# Patient Record
Sex: MALE | Race: WHITE | URBAN - METROPOLITAN AREA
[De-identification: names, ages, dates, MRNs, and addresses within clinical notes are randomized per-mention and may not be internally consistent; named-entity substitution may affect disease eponyms.]

---

## 2018-01-25 ENCOUNTER — OFFICE VISIT (OUTPATIENT)
Dept: OTHER | Facility: OUTPATIENT CENTER | Age: 43
End: 2018-01-25
Payer: COMMERCIAL

## 2018-01-25 DIAGNOSIS — F52.8 OTHER SEXUAL DYSFUNCTION NOT DUE TO A SUBSTANCE OR KNOWN PHYSIOLOGICAL CONDITION: Primary | ICD-10-CM

## 2018-01-25 ASSESSMENT — ANXIETY QUESTIONNAIRES
1. FEELING NERVOUS, ANXIOUS, OR ON EDGE: NOT AT ALL
3. WORRYING TOO MUCH ABOUT DIFFERENT THINGS: NOT AT ALL
7. FEELING AFRAID AS IF SOMETHING AWFUL MIGHT HAPPEN: NOT AT ALL
6. BECOMING EASILY ANNOYED OR IRRITABLE: NOT AT ALL
2. NOT BEING ABLE TO STOP OR CONTROL WORRYING: NOT AT ALL
5. BEING SO RESTLESS THAT IT IS HARD TO SIT STILL: NOT AT ALL
GAD7 TOTAL SCORE: 0

## 2018-01-25 ASSESSMENT — PATIENT HEALTH QUESTIONNAIRE - PHQ9: 5. POOR APPETITE OR OVEREATING: NOT AT ALL

## 2018-01-25 NOTE — PROGRESS NOTES
"[facility]  Program in Human Sexuality  Department of Family Medicine & Community Health  [facility]   [address]               [place] [address]  Phone: [phone]  Fax: [phone]  [email or web address]    Relationship and Sex Therapy (REST) Diagnostic Assessment Interview    Date of Service: 1/25/18  Client Name: Genevieve Smyth  YOB: 1975  42 year old  MRN:  [id number]  Gender/Gender Identity: Male He/Him  Treating Provider: Paul Waddell PsyD   Program: REST  Type of Session: Assessment  Present in Session: Client   Number of Minutes:  55       Instructions for therapists: X Introduce yourself; X Description of process;    X Redcap Questionnaires; X Psychological Testing (follow prior authorization procedures as needed); X Review client confidentiality     Sources of Information: All information in this assessment was taken from the Client's initial paperwork and clinical interview.    Referral Source: Client received a suggestion to seeks services at the [facility] by another healthcare provider.     Identifying Information: Client is a 42 year old  individual, assigned male at birth who identifies as a heterosexual male. Client grew up Shinto, he denied any other culturally relevant factors.     Chief Complaint/Presenting Problem and Goals: Client wrote that he has an \"inabitlity to climax with my wife, complicating conceiving a child.\" He wrote that his goal is \"gaining the ability to climax with my wife.\"     During the interview Client clarified that he is unable to orgasm through vaginal intercourse. He shared that he has never been able achieve an orgasm through vaginal intercourse with any partners. Client shared that in their relationship \"it's never been an issue until we started trying to have a child.\" Client indicated that \"it s not that I m embarrassed. Intitally I thought it was because " "we weren t ,  I was raised very Taoist. I thought afterwards that mental break would lift.\" After they were  however, his challenges with orgasm continued. Client shared that this has caused some distress in his partner and their relationship, \"My wife wonders if there is something about her, since she can t get me to orgasm. It makes things more difficult about having a child. In the past when I ve thought it s not a big thing, but it s a big thing.\"  Sexual behaviors/Functioning: Client shared that \"My wife and I have sex fairly regularly, a couple times a week. It s been fairly business like lately. It s a conversation about taking the ovulation tests. It s a convo that we should have sex this evening if the time is right. There's foreplay, intercourse and after intercourse I masturbate, and then we insert the semen into her.\" Client clarified that he is able to orgasm from manual stimulation while she is present.   Client shared that prior to attempting to have children their sexual behavior was \"much more spontenous. Initiated by either one of us.\"    Sexual Desire/Interest/Arousal: Client denied any concerns about sexual desire or arousal. He is satisfied with their current sexual frequency and engages in individual masturbation on weeks when his partner is menstruating.     Erections: Client denied any concerns in achieving and maintaining erections.    Lubrication: Client denied any concerns related to lubrication.    Orgasm and Ejaculation: Client shared that he has always been unable to orgasm through vaginal penetration. He has managed this by stimulating himself through masturbation to orgasm. Client is able to do this with a partner present. He shared that after vaginal penetration, he will masturbate and achieve an orgasm \"sometimes almost immediately and sometimes it takes several minutes.\" Client denied having any experiences of orgasm without ejaculation. It is unclear if Client can " "reach orgasm through partnered stimulation through other methods besides vaginal penetration. This should be explored further in subsequent sessions.      Pain During Sex: Client denied any concerns related to sexual pain.    Sexual aversion/avoidance: Initially Client denied that he had any sexual aversion, however he went on to say \"I've avoided sex most of my life, I'm 42 right now and my first sexual experience was [age].\" The reasons underlying Client's avoidance will be covered in more detail during subsequent sessions.     Masturbation: Client currently masturbates \"every time we have sex\" and \"a couple times of a week\" when his partner is menstruating. Client began masturbating \"when I was a teenager\" and learned how through \"self-exploration.\" During this time he would use \"magazines, stories, and sometimes my own imagination.\" Client recalled that \"I didn't feel bad about it but I knew I couldn't get caught.\"  Self-exploration. Sometimes it was magazines, stories, sometimes just my own imagination.  Back before internet. Didn t feel bad about it but knew I couldn t get caught.     Sexual Interests: Client denied engaging in any compulsive sexual behavior. Client denied having or engaging in any paraphilic interests or fetishes. He shared \"no we're pretty johnson milktoast.\"     Client denied any concerns related to sexual orientation or gender identity.     First sexual experience with partner: Client's first sexual experience with a partner was around the age of 35 or 36. Client described the event as follows: \"I d been dating a girl for a month or so. We went to a friends [employer] party. We went back to my place. We started fooling around. She made a comment  I bet you don t have condoms  but I did and we ended up having sex. I was thrilled at the moment but after the fact she left right after and then a few days later broke up with me. She made a comment that she had never not been able to get a nallely to " "climax before.\"    Unpleasant or traumatic sexual experiences: Client denied having any unpleasant or traumatic sexual experiences. However, as he described his initial sexual experience with a partner, it appeared to be negative.     Relationship History:    Past Relationship History: Client identified four sexual relationships including his current wife. Client shared that he has been unable to orgasm during vaginal penetration with all of his partners. Client shared that all of his relationships prior to the one with is wife were \"fairly short term. It was simply a matter of, I would meet a girl go out on a series of dates. We would have sex and as we got to know each other and found out we were different places.\"     Current Relationship History: Client's wife is 39 and a veternarian technologist. The two have been together almost five years and  for almost three years. Client meet his wife on an online dating website. He shared that their relationship \"was a fairly slow progression.  We were in a relationship before having sex.\"  He shared that his inability to orgasm through vaginal penetration was never a concern early on in their relationship. However, around 2015 when they were engaged they began trying to conceive a child. Complicating the mater, Client's partner has polycystic ovarian syndrome. The two are meeting with a fertility specialist to determine what their options are.     Couple Relationship Assessment   1 =  Totally Dissatisfied     10 = Totally Satisfied                 Overall happiness/Satisfaction:  9   Personality of partner:  10   Communication/Conflict resolution:  8   Financial management:  8   Sexual relationship:  6   Children/parenting:   N/A   Family and friends:  8   Household roles/responsibilities:  9   Other: 0       Mental Health History:Client denied a history of hospitalizations for mental health. Client denied a history of suicide attempts. Client reported a history " "of suicidal ideation that was passive,  \"I never made a plan but I did think okay if I was going to do it how would I?\" Client shared that this was in 2009 and he attributed this to being \"alone in a bad job.\" Client denied engaging in any self-injurious behaviors.     Client affirmed a history of previous psychotherapy. Client saw a psychologist in [place], [place] in 2009 for 10 to 11 months. Client worked on suicidal ideation, depression, and his negative work environment. Client explained that \"work just kept reinforcing that I was alone. Client shared that psychotherapy was helpful but that \"getting laid off was more helpful.\"     Client has not been prescribed any medications for mental health symptoms    Client affirmed a history of mental health concerns in his family. He shared that \"I believe my dad was depressed but he was never diagnosed.\"     Substance Use: Client does not use any nicotine products. He shared that his alcohol consumption is \"once or twice a month.\" Client explained that he is part of a beer club with his brother in law. Client will have a beer when he picks up the monthly shipment and will consume more \"once or twice a year\" when his brother in law is in town. Client denied using any other substances.    Client received a score of 0 on the CAGE, a screener of substance abuse, indicating no self-reported substance use issues.     Client denied a history of substance use concerns in his family.     Medical History:Client receives primary medical care from Gregory Iyer at [facility] in [place], [place]. He identified the following medical conditions: Type 2 Diabetes and low thyroid. Client was recently diagnosed with these conditions in December of 2017. He is currently prescribed: Metformin (1000 Mg) and Levothyroxine (50 Mg). Client denied having any major illness and/or injuries. He has never been hospitalized for medical reasons.    Client shared that due to fertility " "concerns he has had his sexual health evaluated. They found no abnormalities with his semen. His testosterone levels were \"within the normal range but borderline low.\"     Family History: Client was born and raised in [place], [place]. He grew up in an intact family with his father, mother, one older brother, and one younger brother. Client described his family life growing up as \"Fairly typical leave to [place] family. My parents were  almost 40 years. My dad past away a month before their anniversity. We went to [employer], I played baseball, and was in [employer].\"     Client's father is . Client described their relationship was \"very close and positive.\" Client's mother lives in [place] with Client's younger brother. Client visits them \"a couple times a month.\" Client described his relationship with his mother as \"very close and positive.\"     Client's oldest brother is  and has children. He sees them approximately once a month. Client expressed some resentment in the way Client and his wife are viewed as a couple without children by his brother's family.    Information about Client's families communication and attitudes about sexuality was not covered in this session. This will be explored further in subsequent sessions.                        Abuse History: Client denied experiencing any verbal/emotional, physical, and/or sexual abuse.     Educational History: Client attended private [employer] for middle and high school. Client received his bachelors from [employer] in  with a focus in international business and Albanian. He recently started a masters in business administration at [employer] which he hopes to complete by 2020. Client denied any concerns related to performance or behavior in school settings.     Occupational history: Client is employed as a  at [employer] where he has worked for six years. He shared that the job and the job environment are good " "and that \"I'm good at what I do and I enjoy it.\" However, he indicated experiencing some financial dissatisfaction and that he has not received a promotion.     Client shared that he was extremely dissatisfied at his prior job. He identified that he had a high level of work stress and had long hours, which his boss expressed he should do because he did not have a family, unlike his co-workers.    Legal Issues: None.    CONCLUSIONS    Strengths and Liabilities: Client identified his strengths as \"ideation, deliberative, achiever, intellectual input.\" Client did not identify any limitations. However, it appears from this interview, that Client is prone to having a pessimistic outlook on himself, others, the world, and the future.    Symptoms: Client denied making any plans to end his life in the past two weeks. He denied having thoughts that he would be better off dead or hurting himself in some way in the past two weeks.Client shared that the last time he experienced passive suicidal thoughts was in 2009.     Of a list of common mental health concerns which he has been experiencing in the past six months, Client endorsed the following three: overweight, sexual performance difficulties, and work/family problems.      PHQ-9:  PHQ-9 ([employer])        No Interest In Doing Things   0   Feeling Depressed   0   Trouble Sleeping   0    Tired / No Energy   0     No appetite or Over-Eating   0   Feeling Bad about Self   0     Trouble Concentrating   0     Moving Slow or Restless   0     Suicidal Thoughts   0     Total Score   0       PHQ-9 SCORING CARE FOR SEVERITY  Interpretation of Total Score  Total Score   Depression Severity and Recommendations     0-9   No Major Depression     10-14   Moderate. Initial weekly follow up. If patient is responding, monthly contacts. Meds or therapy.     15-19   Moderate severe. Initial weekly follow up. If patient is responding, 2-4 week contacts. Meds and/or therapy.     >20   Severe. Weekly " "contact. Meds and therapy.             MARY-7:  MARY-7 (Pf[employer])        Feeling nervous, anxious, or on edge  0   Not being able to stop or control worrying 0   Worrying too much about different things 0   Trouble relaxing 0   Being so restless that it is hard to sit still 0   Becoming easily annoyed or irritable 0   Feeling afraid, as if something awful might happen 0   Total Score   0      MARY-7 SCORING FOR SEVERITY  Interpretation of Total Score  Total Score   Anxiety Severity    0 - 5  Minimal anxiety   6 - 10  Mild anxiety   11 - 15 Moderate anxiety   16 - 21 Severe anxiety       Mental Status:   Appearance:  Casually dressed and Adequately groomed  Behavior/relationship to examiner/demeanor:  Cooperative, Engaged and Pleasant  Orientation: Oriented to person, place, time and situation  Speech Rate:  Normal  Speech Spontaneity:  Slightly Halting  Mood:  \"good\"  Affect:  Appropriate/mood-congruent and Anxious/Nervous  Thought Process (Associations):  Logical, Linear and Goal directed  Thought Content:  no evidence of suicidal or homicidal ideation, no overt psychosis and depressive cognitions  Abnormal Perception:  None  Attention/Concentration:  Normal  Language:  Intact  Insight:  Fair  Judgment:  Fair        DSM-5 Diagnosis:  Diagnoses       Codes Comments    Other specified sexual dysfunction - 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 Client reported that he has never been able to reach orgasm with a partner through vaginal penetration. He is able to reach orgasm through masturbation both alone and with his partner present. Client initially attributed these challenges to his Restoration upbringing and attitudes about having sex outside of marriage. However, since being  he has become confused about the etiology of his sexual challenges. Client " "shared that prior to he and his wife attempting to conceive a child, this challenge was not problematic for him. However, Client made references to comments prior partners made about his sexual challenges that appear as if they were distressing for him. These challenges are causing significant distress for Client and are impacting his relationship with his wife.    Interactive Complexity: None    Conclusions/Recommendations/Initial Treatment Goals: The client is a 42 year old American person assigned male at birth who identifies as a heterosexual male. 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 The client's sexual health concerns affect client's ability to function in his relationship with his wife and have been causing clinically significant distress. The client reports no drug use and alcohol use \"once or twice a month.\" The patient is also struggling with some dissatisfaction with his inability to advance in his job. Client denies any current safety concerns. Based on the client's reported symptoms and impact on functioning, the plan for the patient is:  1. Start supportive individual therapy with a provider specialized in sexual health, with incorporation of partner as needed. Therapy should focus on:   a. Developing couple sexual comfort, relaxation and cooperation  b. Promoting desire, pleasure and arousal.  c. Enhancing arousal and erotocism.  d. Increasing sexual flexibility.  e. Exploring Client's understanding of his sexual response cycle, erotic flow, and orgasm inevitability.  f. Exploring Client's sexual attitudes and values, as well as how these developed.  g. Exploring the impact of couples goal to conceive a child and how that impacts ability for sexual flexibility.  h. Exploring Client's health habits and how they impede his sexual functioning  2. Consider a medical " evaluation by a provider specialized in sexual health to determine if there are any physical barriers to orgasm.  3. Continue to assess the impact of client's family and relational patterns on their current well-being.   4. Continue to evaluate Client's mental health in relationship to current sexual stressors to determine if he meets criteria for adjustment, anxiety, or mood disorder.      Paul Waddell PsyD  Postdoctoral Fellow

## 2018-01-25 NOTE — MR AVS SNAPSHOT
After Visit Summary   1/25/2018    Genevieve Smyth    MRN: [id number]           Patient Information     Date Of Birth          1975        Visit Information        Provider Department      1/25/2018 2:00 PM Paul Waddell PhD [facility]        Today's Diagnoses     Other sexual dysfunction not due to a substance or known physiological condition    -  1       Follow-ups after your visit        Your next 10 appointments already scheduled     Feb 05, 2018  6:00 PM CST   INDIVIDUAL THERAPY with Paul Waddell PhD   [facility] ([facility])    [address]  Mail [address]  [facility] [address]   [phone]            Mar 01, 2018  4:00 PM CST   INDIVIDUAL THERAPY with Paul Waddell PhD   [facility] ([facility])    [address]  [address]  [facility] [address]   [phone]            Mar 15, 2018  4:00 PM CDT   INDIVIDUAL THERAPY with Paul Waddell PhD   [facility] ([facility])    [address]  [address]  [facility] [address]   [phone]            Mar 29, 2018  4:00 PM CDT   INDIVIDUAL THERAPY with Paul Waddell PhD   [facility] ([facility])    [address]  [address]  [facility] [address]   [phone]              Who to contact     Please call your clinic at [phone] to:    Ask questions about your health    Make or cancel appointments    Discuss your medicines    Learn about your test results    Speak to your doctor   If you have compliments or concerns about an experience at your clinic, or if you wish to file a complaint, please contact [facility] Patient Relations at [phone] or email us at [email or web address]         Additional Information About Your Visit        MyChart Information     [employer] is an electronic gateway that provides easy, online access to your medical  records. With [employer], you can request a clinic appointment, read your test results, renew a prescription or communicate with your care team.     To sign up for [employer] visit the website at [email or web address]   You will be asked to enter the access code listed below, as well as some personal information. Please follow the directions to create your username and password.     Your access code is: [email or web address]  Expires: 2018 12:13 PM     Your access code will  in 90 days. If you need help or a new code, please contact your [facility] or call [phone] for assistance.        Care EveryWhere ID     This is your Care EveryWhere ID. This could be used by other organizations to access your [facility] medical records  [id number]         Blood Pressure from Last 3 Encounters:   No data found for BP    Weight from Last 3 Encounters:   No data found for Wt              We Performed the Following     Diagnostic Assessment (complete) [28613]        Primary Care Provider    None Specified       No primary provider on file.        Equal Access to Services     HILTON RODRIGUEZ : Hadii julieta moreno hadasho Soomaali, waaxda luqadaha, qaybta kaalmada adeegyafer, nuris rock . So [facility] [phone].    ATENCIÓN: Si habla español, tiene a jackson disposición servicios gratuitos de asistencia lingüística. Llame al [phone].    We comply with applicable federal civil rights laws and [place] laws. We do not discriminate on the basis of race, color, national origin, age, disability, sex, sexual orientation, or gender identity.            Thank you!     Thank you for choosing [facility]  for your care. Our goal is always to provide you with excellent care. Hearing back from our patients is one way we can continue to improve our services. Please take a few minutes to complete the written survey that you may receive in the mail after your visit with us. Thank  you!             Your Updated Medication List - Protect others around you: Learn how to safely use, store and throw away your medicines at www.disposemymeds.org.      Notice  As of 1/25/2018 11:59 PM    You have not been prescribed any medications.

## 2018-01-30 ASSESSMENT — PATIENT HEALTH QUESTIONNAIRE - PHQ9: SUM OF ALL RESPONSES TO PHQ QUESTIONS 1-9: 0

## 2018-01-30 ASSESSMENT — ANXIETY QUESTIONNAIRES: GAD7 TOTAL SCORE: 0

## 2018-01-31 PROBLEM — F52.8 OTHER SEXUAL DYSFUNCTION NOT DUE TO A SUBSTANCE OR KNOWN PHYSIOLOGICAL CONDITION: Status: ACTIVE | Noted: 2018-01-31

## 2018-01-31 NOTE — PROGRESS NOTES
I did not personally see the patient but I have reviewed and agree with the assessment and plan as documented in this note.  Audrey Lovell, PhD -- Supervisor   Licensed Psychologist

## 2018-02-05 ENCOUNTER — OFFICE VISIT (OUTPATIENT)
Dept: OTHER | Facility: OUTPATIENT CENTER | Age: 43
End: 2018-02-05
Payer: COMMERCIAL

## 2018-02-05 DIAGNOSIS — F52.8 OTHER SEXUAL DYSFUNCTION NOT DUE TO A SUBSTANCE OR KNOWN PHYSIOLOGICAL CONDITION: Primary | ICD-10-CM

## 2018-02-05 NOTE — MR AVS SNAPSHOT
After Visit Summary   2/5/2018    Genevieve Smyth    MRN: [id number]           Patient Information     Date Of Birth          1975        Visit Information        Provider Department      2/5/2018 6:00 PM Paul Waddell, PhD [facility]        Today's Diagnoses     Other sexual dysfunction not due to a substance or known physiological condition    -  1       Follow-ups after your visit        Your next 10 appointments already scheduled     Mar 01, 2018  4:00 PM CST   INDIVIDUAL THERAPY with Paul Waddell PhD   [facility] ([facility])    [address]  [address]  [facility] [address]   [phone]            Mar 15, 2018  4:00 PM CDT   INDIVIDUAL THERAPY with Paul Waddell PhD   [facility] ([facility])    [address]  [address]  [facility] [address]   [phone]            Mar 29, 2018  4:00 PM CDT   INDIVIDUAL THERAPY with Paul Waddell PhD   [facility] ([facility])    [address]  [address]  [facility] [address]   [phone]              Who to contact     Please call your clinic at [phone] to:    Ask questions about your health    Make or cancel appointments    Discuss your medicines    Learn about your test results    Speak to your doctor            Additional Information About Your Visit        MyChart Information     [employer] is an electronic gateway that provides easy, online access to your medical records. With [employer], you can request a clinic appointment, read your test results, renew a prescription or communicate with your care team.     To sign up for [employer] visit the website at [email or web address]   You will be asked to enter the access code listed below, as well as some personal information. Please follow the directions to create your username and password.     Your access code is: [email or web address]  Expires: 5/1/2018 12:13 PM     Your  access code will  in 90 days. If you need help or a new code, please contact your [facility] or call [phone] for assistance.        Care EveryWhere ID     This is your Care EveryWhere ID. This could be used by other organizations to access your [facility] medical records  [id number]         Blood Pressure from Last 3 Encounters:   No data found for BP    Weight from Last 3 Encounters:   No data found for Wt              We Performed the Following     Individual Psychotherapy (38-52 min) [42596]        Primary Care Provider    None Specified       No primary provider on file.        Equal Access to Services     [facility]: Hadii aad tiffanie hadstefo [place], waaxda luqadaha, qaybluis kaalmafer ortiz, nuris rock . So [facility] [phone].    ATENCIÓN: Si habla español, tiene a jackson disposición servicios gratuitos de asistencia lingüística. Llame al [phone].    We comply with applicable federal civil rights laws and [place] laws. We do not discriminate on the basis of race, color, national origin, age, disability, sex, sexual orientation, or gender identity.            Thank you!     Thank you for choosing [facility]  for your care. Our goal is always to provide you with excellent care. Hearing back from our patients is one way we can continue to improve our services. Please take a few minutes to complete the written survey that you may receive in the mail after your visit with us. Thank you!             Your Updated Medication List - Protect others around you: Learn how to safely use, store and throw away your medicines at www.disposemymeds.org.      Notice  As of 2018 11:59 PM    You have not been prescribed any medications.

## 2018-02-08 NOTE — PROGRESS NOTES
[facility] -  Case Progress Note    Date of Service: 2/05/18   Client Name: Genevieve Smyth, He/Him/His  YOB: 1975  MRN:  [id number]  Treating Provider: Paul Waddell PsyD  Type of Session: Individual  Present in Session: Client   Number of Minutes:  50    Current Symptoms/Status:  Inability to achieve orgasm with partner  Distress related to sexual concerns  Relationship discord due to sexual concerns    Progress Toward Treatment Goals: Client is actively engaged in discussing sexual concerns.    Intervention: Modality/Description and Response: Used integrative psychotherapy techniques incorporating CBT, emotion-focused, and psychodynamic theories.     Checked in with Client about time elapsed between sessions. Client shared that they had been uneventful. He did not have any particular reactions to initial session. Client processed some of his reactions to Gamar measures.    Further assessed Client's concerns with orgasm. Client clarified that he is unable to orgasm from any partnered stimulation. He did cite one experience where he was able to achieve orgasm from manual stimulation by a sexual partner.    Gave Client feedback about diagnosis of Male orgasmic disorder. Discussed the specifiers of situational and how this typically signifies more psychological barriers rather than medical. However, discussed that a medical evaluation to rule out any physiological issues would be good. Client was receptive to this. Also, discussed how lifestyle can impact sexual functioning. Explored how this connects with Client with metabolic issues. Prized how Client has found ways of managing this in the past and that there are others that Client can work with this writer to develop. Explored how fertility has caused sexual functioning issues to be a source of distress.     Discussed process of psychotherapy for sexual dysfunction. Explored Client's willingness to include partner in process. He  expressed an interest but cited scheduling barriers. He will discuss option with wife. Discussed overall model of good enough sex, meaning ability to enhance sexual flexibility and satisfaction while managing sexual dysfunction. Discussed use of sexual assignments geared towards increasing relaxation, cooperation, understanding sexual response, increasing erotic flow, and sexual flexibility. Explored Client's willingness to have sexual interactions which are not focused on fertility. He appeared to be open to this.       Assignment: Discuss with partner possibility of coming in for sessions.    Interactive Complexity: None.    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  Complete treatment plan. Psychoeducation on sexual response cycle.     Paul Waddell PsyD  Postdoctoral Fellow

## 2018-03-01 ENCOUNTER — OFFICE VISIT (OUTPATIENT)
Dept: OTHER | Facility: OUTPATIENT CENTER | Age: 43
End: 2018-03-01
Payer: COMMERCIAL

## 2018-03-01 ENCOUNTER — TEAM CONFERENCE (OUTPATIENT)
Dept: OTHER | Facility: OUTPATIENT CENTER | Age: 43
End: 2018-03-01

## 2018-03-01 DIAGNOSIS — F52.8 OTHER SEXUAL DYSFUNCTION NOT DUE TO A SUBSTANCE OR KNOWN PHYSIOLOGICAL CONDITION: Primary | ICD-10-CM

## 2018-03-01 NOTE — TELEPHONE ENCOUNTER
Medical/Psychiatric/Psyhological Consultation Form    Date:  3/1/2018     Name:  Genevieve Cormierases:    :  1975  MRN:  [id number]    To:  Dr. Munguia     Reason for Consult:  Sexual Medicine Exam (60)    DSM Diagnosis:  F52.8 Other Sexual Dysfunction - Male Orgasmic disorder, situational, lifelong, severe    Consult reason / list test to be given/interpretted and additional information: Client reports an inablity to attain orgasm through any stimulation by partner, throughout his whole sexual history. Client is able to attain orgasm by manual stimulation with partner near. Has had testosterone panel and general physical by primary care provider.     OK for Medical Student to sit in:  No

## 2018-03-01 NOTE — MR AVS SNAPSHOT
After Visit Summary   3/1/2018    Genevieve Smyth    MRN: [id number]           Patient Information     Date Of Birth          1975        Visit Information        Provider Department      3/1/2018 4:00 PM Paul Waddell, PhD [facility]        Today's Diagnoses     Other sexual dysfunction not due to a substance or known physiological condition    -  1       Follow-ups after your visit        Your next 10 appointments already scheduled     Mar 29, 2018  4:00 PM CDT   Individual Therapy 53+ minutes with Paul Waddell,    [facility] ([facility])    [address]  [address]  [facility] [address]   [phone]            2018  9:00 AM CDT   New Patient Visit with Josh Munguia MD   [facility] ([facility])    [address]  [address]  [facility] [address]   [phone]              Who to contact     Please call your clinic at [phone] to:    Ask questions about your health    Make or cancel appointments    Discuss your medicines    Learn about your test results    Speak to your doctor            Additional Information About Your Visit        Mobile[employer] Online Scheduling Information     [employer] is an electronic gateway that provides easy, online access to your medical records. With [employer], you can request a clinic appointment, read your test results, renew a prescription or communicate with your care team.     To sign up for [employer] visit the website at [email or web address]   You will be asked to enter the access code listed below, as well as some personal information. Please follow the directions to create your username and password.     Your access code is: [email or web address]  Expires: 2018  1:13 PM     Your access code will  in 90 days. If you need help or a new code, please contact your [facility] or call [phone] for assistance.        Care EveryWhere ID     This is  your Care EveryWhere ID. This could be used by other organizations to access your [facility] medical records  [id number]         Blood Pressure from Last 3 Encounters:   No data found for BP    Weight from Last 3 Encounters:   No data found for Wt              We Performed the Following     Individual Psychotherapy (38-52 min) [02184]     Mental Health Tx Plan Scan (HIM Scan)        Primary Care Provider    None Specified       No primary provider on file.        Equal Access to Services     [facility]: Hadii aad ku hadasho Soomaali, waaxda luqadaha, qaybta kaalmada adesheayada, nuris morelandin hayaan domitila casearlenejeannette rock . So [facility] [phone].    ATENCIÓN: Si habla español, tiene a jackson disposición servicios gratuitos de asistencia lingüística. Llame al [phone].    We comply with applicable federal civil rights laws and [place] laws. We do not discriminate on the basis of race, color, national origin, age, disability, sex, sexual orientation, or gender identity.            Thank you!     Thank you for choosing [facility]  for your care. Our goal is always to provide you with excellent care. Hearing back from our patients is one way we can continue to improve our services. Please take a few minutes to complete the written survey that you may receive in the mail after your visit with us. Thank you!             Your Updated Medication List - Protect others around you: Learn how to safely use, store and throw away your medicines at www.disposemymeds.org.      Notice  As of 3/1/2018 11:59 PM    You have not been prescribed any medications.

## 2018-03-01 NOTE — PROGRESS NOTES
"[facility] -  Case Progress Note    Date of Service: 3/01/18   Client Name: Genevieve Smyth, Hemanth/Him/His  YOB: 1975  MRN:  [id number]  Treating Provider: Paul Waddell PsyD  Type of Session: Individual  Present in Session: Client   Number of Minutes:  52    Current Symptoms/Status:  Inability to achieve orgasm with partner  Distress related to sexual concerns  Relationship discord due to sexual concerns    Progress Toward Treatment Goals: Client is actively engaged in discussing sexual concerns.    Intervention: Modality/Description and Response: Used integrative psychotherapy techniques incorporating CBT, emotion-focused, and psychodynamic theories.     Checked in with Client about time elapsed between sessions. Client shared that they have been status quo. Client shared that partner has begun fertility assessment process.    Collaboratively created treatment plan with Client. Client identified the following concerns to work on: inability to attain orgasm through interaction with partner and interpersonal strain in relationship due to sexual functioning concerns. Explored with Client more in depth how sexual functioning concerns impact him, his partner, and them as a couple. Discussed coordinating care with PCP. Completed referral for sexual health exam.    Client shared about two sexual encounter's he and his partner had since last session. Explored with Client what their \"status quo\" sexual behaviors are like. Explored with Client what his manual stimulation pattern is like, is understanding of personal orgasm cues, and his ability to communicate these with partner. Client shared that there is little communication about sex feelings/desires. Introduced assignment of discussing sexual feelings and the importance of communication with in sex therapy treatment and managing sexual concerns.     Assignment: Client will go through communication about sexual feelings/desires homework with " partner.     Interactive Complexity: None.    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 Psychoeducation on sexual response cycle.     Paul Waddell PsyD  Postdoctoral Fellow

## 2018-03-15 ENCOUNTER — OFFICE VISIT (OUTPATIENT)
Dept: OTHER | Facility: OUTPATIENT CENTER | Age: 43
End: 2018-03-15
Payer: COMMERCIAL

## 2018-03-15 DIAGNOSIS — F52.8 OTHER SEXUAL DYSFUNCTION NOT DUE TO A SUBSTANCE OR KNOWN PHYSIOLOGICAL CONDITION: Primary | ICD-10-CM

## 2018-03-15 NOTE — PROGRESS NOTES
"[facility] -  Case Progress Note    Date of Service: 3/15/18   Client Name: Genevieve Smyth, He/Him/His  YOB: 1975  MRN:  [id number]  Treating Provider: Paul Waddell PsyD  Type of Session: Individual  Present in Session: Client   Number of Minutes:  52    Current Symptoms/Status:  Inability to achieve orgasm with partner  Distress related to sexual concerns  Relationship discord due to sexual concerns    Progress Toward Treatment Goals: Client is actively engaged in discussing sexual concerns.    Intervention: Modality/Description and Response: Used integrative psychotherapy techniques incorporating CBT, emotion-focused, and psychodynamic theories.     Checked in with Client about time elapsed between sessions.     Client shared about fertility process. He explored how this has been impacting his wife's mood. Pushed Client to be internally focused. He was able to identify some helplessness and irritation. Client shared that the two recently had an argument and partner requested he find them a couples therapist. Gave Client support and ideas for couples therapy referrals.    Client discussed completing sexual communication homework. He shared about he and his partner's discrepancy in sexual experience. Client shared that there are parts of sexuality that partner is closed off to.     Worked with Client to identify his erotic scripts. Client was able to identify formats: imagination, erotic stories, and porn videos. He had significant difficulty in doing so. He shared that \"I don't like extremes in any way which makes it hard to put into words.\"       Assignment: Reflect on erotic scripts, what Client finds arousing.     Interactive Complexity: Communication difficulties present during current the psychiatric procedure included the need to manage maladaptive communication related to high anxiety, high reactivity, repeated questions, and disagreement that complicated delivery of care. " Client had difficulty maintaining internal focus. When discussing sexual content Client had difficulty articulating and expressing what his erotic scripts are.      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 Continue exploring Client's erotic scripts.    Paul Waddell PsyD  Postdoctoral Fellow

## 2018-03-15 NOTE — MR AVS SNAPSHOT
After Visit Summary   3/15/2018    Genevieve Smyth    MRN: [id number]           Patient Information     Date Of Birth          1975        Visit Information        Provider Department      3/15/2018 4:00 PM Paul Waddell, PhD [facility]        Today's Diagnoses     Other sexual dysfunction not due to a substance or known physiological condition    -  1       Follow-ups after your visit        Your next 10 appointments already scheduled     Mar 29, 2018  4:00 PM CDT   Individual Therapy 53+ minutes with Paul Waddell,    [facility] ([facility])    [address]  [address]  [facility] [address]   [phone]            2018  9:00 AM CDT   New Patient Visit with Josh Munguia MD   [facility] ([facility])    [address]  [address]  [facility] [address]   [phone]              Who to contact     Please call your clinic at [phone] to:    Ask questions about your health    Make or cancel appointments    Discuss your medicines    Learn about your test results    Speak to your doctor            Additional Information About Your Visit        [employer] Information     [employer] is an electronic gateway that provides easy, online access to your medical records. With [employer], you can request a clinic appointment, read your test results, renew a prescription or communicate with your care team.     To sign up for [employer] visit the website at [email or web address]   You will be asked to enter the access code listed below, as well as some personal information. Please follow the directions to create your username and password.     Your access code is: [email or web address]  Expires: 2018  1:13 PM     Your access code will  in 90 days. If you need help or a new code, please contact your [facility] or call [phone] for assistance.        Care EveryWhere ID     This  is your Care EveryWhere ID. This could be used by other organizations to access your [facility] medical records  [id number]         Blood Pressure from Last 3 Encounters:   No data found for BP    Weight from Last 3 Encounters:   No data found for Wt              We Performed the Following     Individual Psychotherapy (38-52 min) [96101]     Psychotherapy Interactive Complexity [71434]        Primary Care Provider    None Specified       No primary provider on file.        Equal Access to Services     [facility]: Hadii aad ku hadasho Soomaali, waaxda luqadaha, qaybta kaalmada adeegyada, waxay merlyin hayaan adeshea casearlenejeannette lashaen . So [facility] [phone].    ATENCIÓN: Si habla español, tiene a jackson disposición servicios gratuitos de asistencia lingüística. Llame al [phone].    We comply with applicable federal civil rights laws and [place] laws. We do not discriminate on the basis of race, color, national origin, age, disability, sex, sexual orientation, or gender identity.            Thank you!     Thank you for choosing [facility]  for your care. Our goal is always to provide you with excellent care. Hearing back from our patients is one way we can continue to improve our services. Please take a few minutes to complete the written survey that you may receive in the mail after your visit with us. Thank you!             Your Updated Medication List - Protect others around you: Learn how to safely use, store and throw away your medicines at www.disposemymeds.org.      Notice  As of 3/15/2018 11:59 PM    You have not been prescribed any medications.

## 2018-03-20 NOTE — PROGRESS NOTES
I did not personally see the patient.  I reviewed and agree with the assessment and plan as documented in this note.     Daphne Turcios PsyD, LP

## 2018-03-29 ENCOUNTER — OFFICE VISIT (OUTPATIENT)
Dept: OTHER | Facility: OUTPATIENT CENTER | Age: 43
End: 2018-03-29
Payer: COMMERCIAL

## 2018-03-29 DIAGNOSIS — F52.8 OTHER SEXUAL DYSFUNCTION NOT DUE TO A SUBSTANCE OR KNOWN PHYSIOLOGICAL CONDITION: ICD-10-CM

## 2018-03-29 DIAGNOSIS — F43.21 ADJUSTMENT DISORDER WITH DEPRESSED MOOD: Primary | ICD-10-CM

## 2018-03-29 NOTE — PROGRESS NOTES
"[facility] -  Case Progress Note    Date of Service: 3/29/18   Client Name: Genevieve Smyth, He/Him/His  YOB: 1975  MRN:  [id number]  Treating Provider: Paul Waddell PsyD  Type of Session: Individual  Present in Session: Client   Number of Minutes:  52      Current Symptoms/Status:  Inability to achieve orgasm with partner  Distress related to sexual concerns  Relationship discord due to sexual concerns    Progress Toward Treatment Goals: Client is actively engaged in discussing sexual concerns.    Intervention: Modality/Description and Response: Used integrative psychotherapy techniques incorporating CBT, emotion-focused, and psychodynamic theories.     Checked in with Client about time elapsed between sessions.     Client shared that there has been little focus on sex lately due to the high level of stress in their relationship. Reinforced that couples therapy would be useful and doing it within [facility], to provide continuity would be best. Client discussed the barriers to doing this.     Client shared about fertility process. He expressed that it has been putting significant strain on their relationship. Client initially began exploring the demands his wife has placed on him. He was able to move more to his own experience of feeling neglected, invisible, and helpless in the situation. Client became tearful. Reflected on what relationship was like when things were going positively. Explored how to build pieces of this back into the relationship. Client was dismissive of this as a possibility.  Explored Client's ability to communicate about how the fertility process is impacting him and to assert his own needs. Client expressed that he does not feel as if he can assert his needs at this time. Client relayed that wife has shared that the fertility process is \"what you wanted\" and is angry and shut down to Client needs and opinions.    Assignment: None.    Interactive Complexity: " Communication difficulties present during current the psychiatric procedure included the need to manage maladaptive communication related to high anxiety, high reactivity, repeated questions, and disagreement that complicated delivery of care.       Diagnosis:   Diagnoses       Codes Comments    Adjustment disorder with depressed mood    -  Primary F43.21 309.0    Other sexual dysfunction not due to a substance or known physiological condition     F52.8 302.79          Given Client's increased distress as captured by hopeless mood, fatigue, tearfulness, in the process of addressing fertility, a diagnosis of 309.0 Adjustment disorder with depressed mood is being added to his diagnosis.     Plan / Need for Future Services:  Return for therapy in 2 weeks.     Paul Waddell PsyD  Postdoctoral Fellow

## 2018-03-29 NOTE — MR AVS SNAPSHOT
After Visit Summary   3/29/2018    Genevieve Smyth    MRN: [id number]           Patient Information     Date Of Birth          1975        Visit Information        Provider Department      3/29/2018 4:00 PM Paul Waddell, PhD [facility]        Today's Diagnoses     Adjustment disorder with depressed mood    -  1    Other sexual dysfunction not due to a substance or known physiological condition           Follow-ups after your visit        Your next 10 appointments already scheduled     2018  9:00 AM CDT   New Patient Visit with Josh Munguia MD   [facility] ([facility])    [address]  Mail Code 7521  [facility] [address]   [phone]              Who to contact     Please call your clinic at [phone] to:    Ask questions about your health    Make or cancel appointments    Discuss your medicines    Learn about your test results    Speak to your doctor            Additional Information About Your Visit        MyChart Information     Passadot is an electronic gateway that provides easy, online access to your medical records. With [employer], you can request a clinic appointment, read your test results, renew a prescription or communicate with your care team.     To sign up for Passadot visit the website at [email or web address]   You will be asked to enter the access code listed below, as well as some personal information. Please follow the directions to create your username and password.     Your access code is: [email or web address]  Expires: 2018  1:13 PM     Your access code will  in 90 days. If you need help or a new code, please contact your [facility] or call [phone] for assistance.        Care EveryWhere ID     This is your Care EveryWhere ID. This could be used by other organizations to access your [place] medical records  [id number]         Blood Pressure from Last 3 Encounters:   No  data found for BP    Weight from Last 3 Encounters:   No data found for Wt              We Performed the Following     Individual Psychotherapy (38-52 min) [46181]     Psychotherapy Interactive Complexity [46042]        Primary Care Provider    None Specified       No primary provider on file.        Equal Access to Services     HILTON RODRIGUEZ : Hadazam aad ku hadjonatan Hui, wadelonteda luqadaha, qavirgilta kaalmada adehenna, nuris merlyin hayaamark gilmoreshea broussard shweta burrell. So [facility] [phone].    ATENCIÓN: Si habla español, tiene a jackson disposición servicios gratuitos de asistencia lingüística. Llame al [phone].    We comply with applicable federal civil rights laws and [place] laws. We do not discriminate on the basis of race, color, national origin, age, disability, sex, sexual orientation, or gender identity.            Thank you!     Thank you for choosing [place] [facility]  for your care. Our goal is always to provide you with excellent care. Hearing back from our patients is one way we can continue to improve our services. Please take a few minutes to complete the written survey that you may receive in the mail after your visit with us. Thank you!             Your Updated Medication List - Protect others around you: Learn how to safely use, store and throw away your medicines at www.disposemymeds.org.      Notice  As of 3/29/2018 11:59 PM    You have not been prescribed any medications.

## 2018-04-30 ENCOUNTER — OFFICE VISIT (OUTPATIENT)
Dept: OTHER | Facility: OUTPATIENT CENTER | Age: 43
End: 2018-04-30
Payer: COMMERCIAL

## 2018-04-30 VITALS
HEART RATE: 79 BPM | BODY MASS INDEX: 41.75 KG/M2 | HEIGHT: 73 IN | WEIGHT: 315 LBS | DIASTOLIC BLOOD PRESSURE: 97 MMHG | SYSTOLIC BLOOD PRESSURE: 141 MMHG

## 2018-04-30 DIAGNOSIS — E03.9 HYPOTHYROIDISM, UNSPECIFIED TYPE: Primary | ICD-10-CM

## 2018-04-30 RX ORDER — LEVOTHYROXINE SODIUM 50 UG/1
TABLET ORAL
COMMUNITY
Start: 2018-04-04 | End: 2018-04-30 | Stop reason: DRUGHIGH

## 2018-04-30 RX ORDER — LEVOTHYROXINE SODIUM 75 UG/1
75 TABLET ORAL DAILY
Qty: 90 TABLET | Refills: 1 | Status: SHIPPED | OUTPATIENT
Start: 2018-04-30

## 2018-04-30 RX ORDER — METFORMIN HCL 500 MG
TABLET, EXTENDED RELEASE 24 HR ORAL
COMMUNITY
Start: 2018-04-18

## 2018-04-30 NOTE — NURSING NOTE
"Chief Complaint   Patient presents with     Consult     Sexual Dysfuntion       Vitals:    04/30/18 0903   BP: (!) 141/97   Pulse: 79   Weight: (!) 169.2 kg (373 lb)   Height: 1.854 m (6' 1\")       Body mass index is 49.21 kg/(m^2).      Lizette Robison CMA        "

## 2018-04-30 NOTE — MR AVS SNAPSHOT
"              After Visit Summary   2018    Genevieve Smyth    MRN: [id number]           Patient Information     Date Of Birth          1975        Visit Information        Provider Department      2018 9:00 AM Josh Munguia MD [facility]        Today's Diagnoses     Hypothyroidism, unspecified type    -  1       Follow-ups after your visit        Follow-up notes from your care team     Return if symptoms worsen or fail to improve.      Who to contact     Please call your clinic at [phone] to:    Ask questions about your health    Make or cancel appointments    Discuss your medicines    Learn about your test results    Speak to your doctor            Additional Information About Your Visit        MyChart Information     [employer] Committee is an electronic gateway that provides easy, online access to your medical records. With [employer] Committee, you can request a clinic appointment, read your test results, renew a prescription or communicate with your care team.     To sign up for [employer] Caret visit the website at [email or web address]   You will be asked to enter the access code listed below, as well as some personal information. Please follow the directions to create your username and password.     Your access code is: [email or web address]  Expires: 2018 10:18 AM     Your access code will  in 90 days. If you need help or a new code, please contact your [facility] or call [phone] for assistance.        Care EveryWhere ID     This is your Care EveryWhere ID. This could be used by other organizations to access your [place] medical records  [id number]        Your Vitals Were     Pulse Height BMI (Body Mass Index)             79 1.854 m (6' 1\") 49.21 kg/m2          Blood Pressure from Last 3 Encounters:   18 (!) 141/97    Weight from Last 3 Encounters:   18 (!) 169.2 kg (373 lb)              We Performed the Following     SCANNED MISC. LAB RESULTS        "   Today's Medication Changes          These changes are accurate as of 4/30/18 11:59 PM.  If you have any questions, ask your nurse or doctor.               These medicines have changed or have updated prescriptions.        Dose/Directions    levothyroxine 75 MCG tablet   Commonly known as:  SYNTHROID/LEVOTHROID   This may have changed:  See the new instructions.   Used for:  Hypothyroidism, unspecified type   Changed by:  Josh Munguia MD        Dose:  75 mcg   Take 1 tablet (75 mcg) by mouth daily   Quantity:  90 tablet   Refills:  1            Where to get your medicines      These medications were sent to [place] [id number] [facility] [employer] - [facility], [place], [place] - [address]  [address], [facility] [address]     Phone:  [phone]     levothyroxine 75 MCG tablet                Primary Care Provider Office Phone # Fax #    Gregory Iyer [phone]       [facility] [address] [address]        Equal Access to Services     ELIZABETH [facility] : Hadii julieta ku hadasho Soomaali, waaxda luqadaha, qaybta kaalmada adeegyada, nuris montes de oca haynealn domitila rock . So [facility] [phone].    ATENCIÓN: Si rala indira, tiene a jackson disposición servicios gratuitos de asistencia lingüística. Ll[facility] [phone].    We comply with applicable federal civil rights laws and [place] laws. We do not discriminate on the basis of race, color, national origin, age, disability, sex, sexual orientation, or gender identity.            Thank you!     Thank you for choosing [place] [facility]  for your care. Our goal is always to provide you with excellent care. Hearing back from our patients is one way we can continue to improve our services. Please take a few minutes to complete the written survey that you may receive in the mail after your visit with us. Thank you!             Your Updated Medication List - Protect others around you: Learn how to safely use, store and throw away your medicines  at www.disposemymeds.org.          This list is accurate as of 4/30/18 11:59 PM.  Always use your most recent med list.                   Brand Name Dispense Instructions for use Diagnosis    levothyroxine 75 MCG tablet    SYNTHROID/LEVOTHROID    90 tablet    Take 1 tablet (75 mcg) by mouth daily    Hypothyroidism, unspecified type       metFORMIN 500 MG 24 hr tablet    GLUCOPHAGE-XR     Take 1 tab with breakfast and 2 tab with supper each day.

## 2018-05-10 PROBLEM — E11.9 TYPE 2 DIABETES MELLITUS WITHOUT COMPLICATION (H): Status: ACTIVE | Noted: 2018-05-10

## 2018-05-10 PROBLEM — F52.32: Status: ACTIVE | Noted: 2018-05-10

## 2018-05-10 PROBLEM — E03.8 OTHER SPECIFIED HYPOTHYROIDISM: Status: ACTIVE | Noted: 2018-05-10

## 2018-05-10 NOTE — PROGRESS NOTES
"This is a Sexual Medicine consultation at request of Dr. Gus Waddell.      IDENTIFICATION:  A 43-year-old Cisgender man.      CHIEF CONCERN:  Difficulty reaching orgasm lifelong.      HISTORY OF PRESENT ILLNESS:  The patient went through puberty time similar to his peers.  As a teen, he does not remember having wet dreams, although he did masturbate as a teenager and was able to reach orgasm and ejaculate.  He has also continued to masturbate as an adult and has had no difficulty with arousal, erection, or orgasm with masturbation.  He first became sexually active with a partner at age 35.  His partners have been women only.  With his first sexual encounter, he was not able to reach orgasm with this partner and she broke up with him the next day.  With every sexual partner since then he has been unable to achieve orgasm and ejaculation since that time.      He describes being interested in sex, having sexual thoughts and feelings.  He is able to get an erection without difficulty.  Erections are strong and is able to sustain erections easily with his partners.  He is able to achieve penetration and maintain erections for up to and including 7 minutes without achieving orgasm.  He is then able to masturbate afterwards without difficulty and then reach orgasm and ejaculate.  He has no pain with erections or with orgasm.  Symptoms are improved when patient is at a hotel with his partner.  Has not tried masturbating prior to intercourse.  No change with oral sex with a partner.  He is now  and has an additional difficulty of trying to conceive and have a child.  They have had sex without protection for 3 years.  Wife also has polycystic ovarian syndrome.  As part of the fertility workup, he has undergone testosterone level and semen analysis.  Patient was told semen analysis was normal.  Testosterone levels in 04/2017 done in the mid afternoon were on the \"lower end of the normal male range with a total of 294 and " "a free of 7.7.\"  He and his wife will be trying intrauterine insemination this week.  The patient's medical history is complicated by hypothyroidism with a last TSH which was mildly elevated at 4 in December of last year and type 2 diabetes with a last A1c of 7.1 in March, which was decreased from 8 earlier.  He also has sleep apnea treated with CPAP.      CURRENT MEDICATIONS:   1.  Synthroid 50 mcg.   2.  Metformin 1500 mg.      He is not on any statin medication.      ALLERGIES:  He is allergic to sulfa.      PAST MEDICAL HISTORY:  No hospitalizations.  He has had surgery for elbow repair.      FAMILY HISTORY:  Mother with rheumatoid arthritis and diabetes.  Father with diabetes, hypertension, hyperlipidemia, rectal cancer, peripheral artery disease and heart disease.  Siblings are well.  Paternal grandfather with congestive heart failure, heart disease and diabetes, maternal grandmother with unknown cancer, paternal grandmother with skin cancer.      SOCIAL HISTORY:  Patient eats a low-carb diet including dairy; works out 30-60 minutes 2-3 times a week, usually with an elliptical.   with no children.  Currently works a desk job.  He has never smoked.  Alcohol 2 times a year, 3-4 per sitting, no recreational substances.      SEXUAL HISTORY:  The patient has had 4 partners in the lifetime, all female.  No STIs.  He has tested HIV negative.  He is vaccinated against hepatitis B.      REVIEW OF SYSTEMS:  Negative for a 12-point review of systems, except for most notably denies any urinary symptoms or difficulties.      PHYSICAL EXAMINATION:     VITAL SIGNS:  Notable for a Blood pressure elevated at 141/97, Although taken on the forearm due to difficulty with cuff size.  Weight 373 pounds with a BMI of 49.2, pulse is 79.   GENERAL:  Speech regular rate and rhythm, mood appears euthymic.   HEENT:  Pupils equal and reactive to light.  Oropharnyx:  No lesions.   NECK:  Supple, no thyroid enlargement, no carotid " bruits, no adenopathy.   HEART:  S1 S2, no murmurs.   LUNGS:  CTA bilaterally.   ABDOMEN:  Soft, nontender, normal bowel sounds, no liver or spleen enlargement, no masses.  Exam limited to some extent by body habitus.    EXTREMITIES:  Positive pedal pulses bilaterally.  No edema, no lesions.   NEUROLOGIC:  Cranial nerves II-XII are intact.  Deep tendon reflexes 2/4 and symmetric throughout, strength 5/5 and symmetric throughout.  Gait and sensation grossly intact.   GENITAL:  Hair in normal male distribution.  Testes are descended and of normal size and firmness.  There are no masses.  Penis is uncircumcised, no lesions or discharge, no hernias.      ASSESSMENT/PLAN:   1.  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   2.  Hypothyroidism.   3.  Type 2 diabetes.      Counseled the patient regarding the multifactorial nature of difficulties related to delayed orgasm and ejaculation.  Given this is related primarily to intercourse with a partner, it is unlikely to be due to a physiologic disorder but rather due to multiple factors related to relationship issues, stress related to infertility or fertility issues in a partner, learning to be sexually active with a partner as opposed to masturbation.  Patient does have a history of significant obesity, which may make intercourse more challenging with partners in terms of positioning and then anxiety related to intercourse, given history of orgasmic difficulties and late onset of partnered sexual activity.  Recommend to continue with sexual counseling and therapy regarding these issues as the main focus of care.      However, factors that may be inhibiting sexual function, either now or in the lifetime, include hypothyroidism which appears to be undertreated.  Recommend increasing dose of Synthroid to 75 mcg daily and repeating TSH and free T4 in 3 months and to continuing to engage in further weight loss and bring diabetes under appropriate control  with his primary care provider.  Discussed recommendations for statin use in all diabetics and to follow up as needed.  Recommend following up on thyroid and diabetes issues with primary care.

## 2018-07-11 ENCOUNTER — OFFICE VISIT - HEALTHEAST (OUTPATIENT)
Dept: OTOLARYNGOLOGY | Facility: CLINIC | Age: 43
End: 2018-07-11

## 2018-07-11 ENCOUNTER — OFFICE VISIT - HEALTHEAST (OUTPATIENT)
Dept: AUDIOLOGY | Facility: CLINIC | Age: 43
End: 2018-07-11

## 2018-07-11 DIAGNOSIS — R42 DIZZINESS: ICD-10-CM

## 2018-07-11 DIAGNOSIS — H90.42 SENSORINEURAL HEARING LOSS (SNHL) OF LEFT EAR WITH UNRESTRICTED HEARING OF RIGHT EAR: ICD-10-CM

## 2018-07-11 DIAGNOSIS — R42 VERTIGO: ICD-10-CM

## 2018-07-11 DIAGNOSIS — H90.3 ASNHL (ASYMMETRICAL SENSORINEURAL HEARING LOSS): ICD-10-CM

## 2018-08-17 ENCOUNTER — OFFICE VISIT - HEALTHEAST (OUTPATIENT)
Dept: OTOLARYNGOLOGY | Facility: CLINIC | Age: 43
End: 2018-08-17

## 2018-08-17 DIAGNOSIS — R42 VERTIGO: ICD-10-CM

## 2018-10-22 DIAGNOSIS — E03.9 HYPOTHYROIDISM, UNSPECIFIED TYPE: ICD-10-CM

## 2018-10-22 RX ORDER — LEVOTHYROXINE SODIUM 75 UG/1
75 TABLET ORAL DAILY
Qty: 90 TABLET | Refills: 1 | OUTPATIENT
Start: 2018-10-22

## 2018-10-22 NOTE — TELEPHONE ENCOUNTER
Requested Medication:  Levothyroxine  Dose:   75 mcg  Quantity: 90  Refills:  0    Last seen at [facility]:  4/30 - Follow up prn  Next Appointment with Provider:  Visit date not found      Lizette Robison CMA

## 2018-10-26 NOTE — TELEPHONE ENCOUNTER
Requested Medication:  Levothyroxine  Dose:   75 mcg  Quantity: 90  Refills:  0     Last seen at [facility]:  4/30 - Follow up prn  Next Appointment with Provider:  Visit date not found

## 2018-12-21 ENCOUNTER — OFFICE VISIT - HEALTHEAST (OUTPATIENT)
Dept: OTOLARYNGOLOGY | Facility: CLINIC | Age: 43
End: 2018-12-21

## 2018-12-21 DIAGNOSIS — R42 VERTIGO: ICD-10-CM

## 2021-06-19 NOTE — PROGRESS NOTES
"Genevieve Smyth is a 43 y.o. male seen in consultation at the request of Dr. Iyer for vertigo.  Onset: 7/1/2018  Episodic vs Chronic: Episodic  Length of episodes: seconds (30 seconds)  Description of episodes: spinning nausea and vomint (on a tilt a whirl).    Last episode: now  Frequency of episodes: many times a day (anytime he moves)  Positional: yes, turning head,   Change in hearing with episodes:  no  Otologic history of infections or surgery: no  History of headaches: no  Headaches with episodes: no  History of head trauma: no  Previous evaluations: In hospital for 3 days, Neurology consulted and negative MRI. Has gonzalo doing PT, last seen 2 days ago.  Previous medications: Meclizine - has not been on meclizine in 48 hours.      He notes he is somewhat improved since onset    ALLERGY:    Allergies   Allergen Reactions     Cat Dander Hives and Shortness Of Breath     Reports itching in eyes, and skin.  Denies hive rx.  \"airway constriction\" described as Shortness of breath     Rabbit Dander Shortness Of Breath     Reports itching in eys, and skin.  Denies hive rx.    \"airway constriction\" described as shortness of breath     Sulfa (Sulfonamide Antibiotics) Rash       MEDICATIONS:     Current Outpatient Prescriptions on File Prior to Visit   Medication Sig Dispense Refill     aspirin 81 MG EC tablet Take 81 mg by mouth daily.       cetirizine (ZYRTEC) 10 MG tablet Take 1 tablet (10 mg total) by mouth daily.  0     CINNAMON BARK-CHROMIUM PICOLIN ORAL Take 1 capsule by mouth daily with supper.       fexofenadine (ALLEGRA) 180 MG tablet Take 180 mg by mouth daily as needed.       levothyroxine (SYNTHROID, LEVOTHROID) 75 MCG tablet Take 75 mcg by mouth Daily at 6:00 am.        magnesium oxide (MAG-OX) 400 mg tablet Take 400 mg by mouth daily.       metFORMIN (GLUCOPHAGE-XR) 500 MG 24 hr tablet Take 500 mg by mouth daily with breakfast.       metFORMIN (GLUCOPHAGE-XR) 500 MG 24 hr tablet Take 1,000 mg by mouth daily " with supper.       omega-3/dha/epa/fish oil (FISH OIL-OMEGA-3 FATTY ACIDS) 300-1,000 mg capsule Take 1 g by mouth daily.       No current facility-administered medications on file prior to visit.        Past Medical/Surgical History, Family History and Social History reviewed in detail and documented separately in the medical record.    Complete Review of Systems:  A 10-point review was performed.  Pertinent positives are noted in the HPI and on a separate scanned document in the chart.    EXAM:  There were no vitals filed for this visit.    Nurse documentation reviewed  and documented separately.    General Appearance: Pleasant, alert, appropriate appearance for age. No acute distress    Head Exam: Normal. Normocephalic, atraumatic.    Eye Exam: Normal external eye, conjunctiva, lids, cornea. Extra-ocular movements are intact.    Left external ear: normal  Left otoscopic exam: Normal EAC. Normal TM     Right external ear: normal  Right otoscopic exam: Normal EAC. Normal TM    Nose Exam: Normal external nose. Septum midline. Nasal mucosa normal.  Inferior turbinates normal.    OroPharynx Exam: Dental hygiene adequate. Normal tongue. Normal buccal mucosa. Normal palate.  Normal pharynx. Normal tonsils.    Neck Exam: Supple, no masses or nodes. Trachea and larynx midline.    Thyroid Exam: No tenderness, nodules or enlargement.    Salivary Glands: nontender without masses    Neuro: Alert and oriented times 3, CN 2-12 grossly intact, no nystagmus, PERRL, EOMI, normal speech and gait    Chest/Respiratory Exam: Normal chest wall motion and respiratory effort. No audible stridor or wheezing.    Cardiovascular Exam: Regular rate and rhythm.  No cyanosis, clubbing or edema.    Pulses: carotid pulses normal    Vestibular:  Gait is normal, tandem gait: He has a few corrective steps, Romberg is normal, Jeanmarie-Hallpike shows right sided rotary nystagmus with head to the right AND head to the left - no symptoms during this,  Finger-nose-finger is normal, Sponaneous right lateral gaze nystagmus, denis on the left.    ASSESSMENT:  1. Vertigo    2. ASNHL (asymmetrical sensorineural hearing loss)        PLAN: Findings, assessment, and management options were discussed. Likely vestibular neuronitis or viral labyrinthitis given the asymmetry. Will try a short course of steroids given he is still somewhat debilitated from this, albeit he is improving.  MRI rules out other worrisome etiologies.  Follow up in 1 month.

## 2021-06-19 NOTE — PROGRESS NOTES
HPI:  He notes slow progression, some times he has some bad days.  He is doing physical therapy to some success. He feels better after chiropractic manipulation.     Past medical history, surgical history, social history, family history, medications, and allergies have been reviewed with the patient and are documented above.    Review of Systems: a 10-system review was performed. Pertinent positives are noted in the HPI and on a separate scanned document in the chart.    PHYSICAL EXAMINATION:  GEN: no acute distress, normocephalic  EYES: extraocular movements are intact, pupils are equal and round. Sclera clear.   EARS: auricles are normally formed. The external auditory canals are clear with minimal to no cerumen. Tympanic membranes are intact bilaterally with no signs of infection, effusion, retractions, or perforations.  NEURO: CN II-XII are intact bilaterally. alert and oriented. No nystagmus. Gait is normal.  PULM: breathing comfortably on room air, normal chest expansion with respiration  HEART: regular rate and rhythm, no peripheral edema    MEDICAL DECISION-MAKING:   I think he is progressing, albeit slowly.  Will follow up in 3-4 months.

## 2021-06-19 NOTE — PROGRESS NOTES
Audiology Report    Referring Provider:  Dr. Emmanuel    History:  Genevieve Smyth is seen in conjunction with ENT appointment today. He has a history of constant vertigo since July 1st. Genevieve reports he was hospitalized due to vertigo from July 1st-3rd. He states he experiences a constant spinning sensation which is worse when he is moving. Genevieve reports he experienced nausea and vomiting when the vertigo first started; however, these symptoms have improved since he started taking meclizine and a medication for nausea. He denies a history of hearing concerns, otalgia, otorrhea, tinnitus, aural fullness, ear surgery, noise exposure, and family history of hearing loss.    Results:     Left Ear Right Ear   Otoscopy clear canals clear canals   Pure Tone Audiometry normal hearing from 250-2000 Hz sloping to mild sensorineural hearing loss  normal hearing   Word Recognition excellent excellent   Tympanometry normal (Type A)  normal (Type A)     Transducer: Insert earphones and Circumaural headphones    Reliability was good  and there was good  SRT to PTA agreement.       Plan:  The patient is returned to ENT for follow up.  He should return for retesting with ENT recommendation.      Please see audiogram under  media  and  audiogram  in the patient s chart.     Katiana Siddiqui., CCC-A  [place] Licensed Audiologist #[id number]

## 2021-06-22 NOTE — PROGRESS NOTES
"HPI:  Symptoms are \"damn near gone\".  Still gets a flash of imbalance when walking across campus at school.  Hearing is stable.      Past medical history, surgical history, social history, family history, medications, and allergies have been reviewed with the patient and are documented above.    Review of Systems: a 10-system review was performed. Pertinent positives are noted in the HPI and on a separate scanned document in the chart.    PHYSICAL EXAMINATION:  GEN: no acute distress, normocephalic  EYES: extraocular movements are intact, pupils are equal and round. Sclera clear.   EARS: auricles are normally formed. The external auditory canals are clear with minimal to no cerumen. Tympanic membranes are intact bilaterally with no signs of infection, effusion, retractions, or perforations.  NEURO: CN II-XII are intact bilaterally. alert and oriented. No nystagmus. Gait is normal. Romberg normal  PULM: breathing comfortably on room air, normal chest expansion with respiration  HEART: regular rate and rhythm, no peripheral edema    MEDICAL DECISION-MAKING: Satisfactory recovery, at this point he can follow up PRN.    "

## 2021-08-14 ENCOUNTER — HEALTH MAINTENANCE LETTER (OUTPATIENT)
Age: 46
End: 2021-08-14

## 2021-10-09 ENCOUNTER — HEALTH MAINTENANCE LETTER (OUTPATIENT)
Age: 46
End: 2021-10-09

## 2022-03-20 ENCOUNTER — HEALTH MAINTENANCE LETTER (OUTPATIENT)
Age: 47
End: 2022-03-20

## 2022-09-11 ENCOUNTER — HEALTH MAINTENANCE LETTER (OUTPATIENT)
Age: 47
End: 2022-09-11

## 2023-01-23 ENCOUNTER — HEALTH MAINTENANCE LETTER (OUTPATIENT)
Age: 48
End: 2023-01-23

## 2023-07-29 ENCOUNTER — HEALTH MAINTENANCE LETTER (OUTPATIENT)
Age: 48
End: 2023-07-29

## 2023-10-07 ENCOUNTER — HEALTH MAINTENANCE LETTER (OUTPATIENT)
Age: 48
End: 2023-10-07

## 2024-02-24 ENCOUNTER — HEALTH MAINTENANCE LETTER (OUTPATIENT)
Age: 49
End: 2024-02-24